# Patient Record
Sex: FEMALE | Race: WHITE | NOT HISPANIC OR LATINO | URBAN - METROPOLITAN AREA
[De-identification: names, ages, dates, MRNs, and addresses within clinical notes are randomized per-mention and may not be internally consistent; named-entity substitution may affect disease eponyms.]

---

## 2018-08-04 ENCOUNTER — INPATIENT (INPATIENT)
Facility: HOSPITAL | Age: 83
LOS: 2 days | Discharge: HOME | DRG: 305 | End: 2018-08-07
Attending: INTERNAL MEDICINE | Admitting: INTERNAL MEDICINE
Payer: MEDICARE

## 2018-08-04 VITALS
SYSTOLIC BLOOD PRESSURE: 208 MMHG | RESPIRATION RATE: 16 BRPM | OXYGEN SATURATION: 98 % | HEART RATE: 92 BPM | DIASTOLIC BLOOD PRESSURE: 81 MMHG

## 2018-08-04 DIAGNOSIS — L03.119 CELLULITIS OF UNSPECIFIED PART OF LIMB: ICD-10-CM
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
## 2018-08-04 DIAGNOSIS — D64.9 ANEMIA, UNSPECIFIED: ICD-10-CM

## 2018-08-04 DIAGNOSIS — I16.0 HYPERTENSIVE URGENCY: ICD-10-CM

## 2018-08-04 DIAGNOSIS — E87.0 HYPEROSMOLALITY AND HYPERNATREMIA: ICD-10-CM

## 2018-08-04 LAB
ALBUMIN SERPL ELPH-MCNC: 3.8 G/DL — SIGNIFICANT CHANGE UP (ref 3.3–5)
ALP SERPL-CCNC: 65 U/L — SIGNIFICANT CHANGE UP (ref 40–120)
ALT FLD-CCNC: 18 U/L — SIGNIFICANT CHANGE UP (ref 10–45)
ANION GAP SERPL CALC-SCNC: 13 MMOL/L — SIGNIFICANT CHANGE UP (ref 5–17)
APPEARANCE UR: CLEAR — SIGNIFICANT CHANGE UP
AST SERPL-CCNC: 34 U/L — SIGNIFICANT CHANGE UP (ref 10–40)
BASOPHILS # BLD AUTO: 0 K/UL — SIGNIFICANT CHANGE UP (ref 0–0.2)
BASOPHILS NFR BLD AUTO: 0.1 % — SIGNIFICANT CHANGE UP (ref 0–2)
BILIRUB SERPL-MCNC: 0.5 MG/DL — SIGNIFICANT CHANGE UP (ref 0.2–1.2)
BILIRUB UR-MCNC: NEGATIVE — SIGNIFICANT CHANGE UP
BUN SERPL-MCNC: 30 MG/DL — HIGH (ref 7–23)
CALCIUM SERPL-MCNC: 8.8 MG/DL — SIGNIFICANT CHANGE UP (ref 8.4–10.5)
CHLORIDE SERPL-SCNC: 107 MMOL/L — SIGNIFICANT CHANGE UP (ref 96–108)
CK SERPL-CCNC: 503 U/L — HIGH (ref 25–170)
CO2 SERPL-SCNC: 26 MMOL/L — SIGNIFICANT CHANGE UP (ref 22–31)
COLOR SPEC: SIGNIFICANT CHANGE UP
CREAT SERPL-MCNC: 0.93 MG/DL — SIGNIFICANT CHANGE UP (ref 0.5–1.3)
DIFF PNL FLD: ABNORMAL
EOSINOPHIL # BLD AUTO: 0 K/UL — SIGNIFICANT CHANGE UP (ref 0–0.5)
EOSINOPHIL NFR BLD AUTO: 0.1 % — SIGNIFICANT CHANGE UP (ref 0–6)
GLUCOSE SERPL-MCNC: 111 MG/DL — HIGH (ref 70–99)
GLUCOSE UR QL: NEGATIVE — SIGNIFICANT CHANGE UP
HCT VFR BLD CALC: 29.8 % — LOW (ref 34.5–45)
HGB BLD-MCNC: 10.1 G/DL — LOW (ref 11.5–15.5)
HYALINE CASTS # UR AUTO: ABNORMAL
KETONES UR-MCNC: NEGATIVE — SIGNIFICANT CHANGE UP
LEUKOCYTE ESTERASE UR-ACNC: NEGATIVE — SIGNIFICANT CHANGE UP
LYMPHOCYTES # BLD AUTO: 0.6 K/UL — LOW (ref 1–3.3)
LYMPHOCYTES # BLD AUTO: 5.1 % — LOW (ref 13–44)
MCHC RBC-ENTMCNC: 31.3 PG — SIGNIFICANT CHANGE UP (ref 27–34)
MCHC RBC-ENTMCNC: 33.8 GM/DL — SIGNIFICANT CHANGE UP (ref 32–36)
MCV RBC AUTO: 92.7 FL — SIGNIFICANT CHANGE UP (ref 80–100)
MONOCYTES # BLD AUTO: 0.8 K/UL — SIGNIFICANT CHANGE UP (ref 0–0.9)
MONOCYTES NFR BLD AUTO: 7 % — SIGNIFICANT CHANGE UP (ref 2–14)
NEUTROPHILS # BLD AUTO: 9.6 K/UL — HIGH (ref 1.8–7.4)
NEUTROPHILS NFR BLD AUTO: 87.6 % — HIGH (ref 43–77)
NITRITE UR-MCNC: NEGATIVE — SIGNIFICANT CHANGE UP
PH UR: 7 — SIGNIFICANT CHANGE UP (ref 5–8)
PLATELET # BLD AUTO: 347 K/UL — SIGNIFICANT CHANGE UP (ref 150–400)
POTASSIUM SERPL-MCNC: 3.8 MMOL/L — SIGNIFICANT CHANGE UP (ref 3.5–5.3)
POTASSIUM SERPL-SCNC: 3.8 MMOL/L — SIGNIFICANT CHANGE UP (ref 3.5–5.3)
PROT SERPL-MCNC: 7.3 G/DL — SIGNIFICANT CHANGE UP (ref 6–8.3)
PROT UR-MCNC: 100 MG/DL
RBC # BLD: 3.22 M/UL — LOW (ref 3.8–5.2)
RBC # FLD: 12.9 % — SIGNIFICANT CHANGE UP (ref 10.3–14.5)
RBC CASTS # UR COMP ASSIST: SIGNIFICANT CHANGE UP /HPF (ref 0–2)
SODIUM SERPL-SCNC: 146 MMOL/L — HIGH (ref 135–145)
SP GR SPEC: 1.01 — SIGNIFICANT CHANGE UP (ref 1.01–1.02)
UROBILINOGEN FLD QL: NEGATIVE — SIGNIFICANT CHANGE UP
WBC # BLD: 10.9 K/UL — HIGH (ref 3.8–10.5)
WBC # FLD AUTO: 10.9 K/UL — HIGH (ref 3.8–10.5)

## 2018-08-04 PROCEDURE — 93010 ELECTROCARDIOGRAM REPORT: CPT

## 2018-08-04 PROCEDURE — 73610 X-RAY EXAM OF ANKLE: CPT | Mod: 26,RT

## 2018-08-04 PROCEDURE — 99285 EMERGENCY DEPT VISIT HI MDM: CPT | Mod: 25

## 2018-08-04 PROCEDURE — 73700 CT LOWER EXTREMITY W/O DYE: CPT | Mod: 26,RT

## 2018-08-04 PROCEDURE — 73502 X-RAY EXAM HIP UNI 2-3 VIEWS: CPT | Mod: 26,LT

## 2018-08-04 PROCEDURE — 73620 X-RAY EXAM OF FOOT: CPT | Mod: 26,RT

## 2018-08-04 PROCEDURE — 76377 3D RENDER W/INTRP POSTPROCES: CPT | Mod: 26

## 2018-08-04 PROCEDURE — 71045 X-RAY EXAM CHEST 1 VIEW: CPT | Mod: 26

## 2018-08-04 PROCEDURE — 70450 CT HEAD/BRAIN W/O DYE: CPT | Mod: 26

## 2018-08-04 RX ORDER — AMLODIPINE BESYLATE 2.5 MG/1
5 TABLET ORAL DAILY
Qty: 0 | Refills: 0 | Status: DISCONTINUED | OUTPATIENT
Start: 2018-08-04 | End: 2018-08-07

## 2018-08-04 RX ORDER — ENOXAPARIN SODIUM 100 MG/ML
40 INJECTION SUBCUTANEOUS DAILY
Qty: 0 | Refills: 0 | Status: DISCONTINUED | OUTPATIENT
Start: 2018-08-04 | End: 2018-08-07

## 2018-08-04 RX ORDER — ACETAMINOPHEN 500 MG
650 TABLET ORAL EVERY 6 HOURS
Qty: 0 | Refills: 0 | Status: DISCONTINUED | OUTPATIENT
Start: 2018-08-04 | End: 2018-08-07

## 2018-08-04 RX ORDER — ACETAMINOPHEN 500 MG
650 TABLET ORAL ONCE
Qty: 0 | Refills: 0 | Status: COMPLETED | OUTPATIENT
Start: 2018-08-04 | End: 2018-08-04

## 2018-08-04 RX ORDER — MULTIVIT-MIN/FERROUS GLUCONATE 9 MG/15 ML
1 LIQUID (ML) ORAL
Qty: 0 | Refills: 0 | COMMUNITY

## 2018-08-04 RX ORDER — LISINOPRIL 2.5 MG/1
10 TABLET ORAL DAILY
Qty: 0 | Refills: 0 | Status: DISCONTINUED | OUTPATIENT
Start: 2018-08-04 | End: 2018-08-06

## 2018-08-04 RX ORDER — SODIUM CHLORIDE 9 MG/ML
1000 INJECTION INTRAMUSCULAR; INTRAVENOUS; SUBCUTANEOUS ONCE
Qty: 0 | Refills: 0 | Status: COMPLETED | OUTPATIENT
Start: 2018-08-04 | End: 2018-08-04

## 2018-08-04 RX ORDER — AMLODIPINE BESYLATE 2.5 MG/1
5 TABLET ORAL ONCE
Qty: 0 | Refills: 0 | Status: COMPLETED | OUTPATIENT
Start: 2018-08-04 | End: 2018-08-04

## 2018-08-04 RX ADMIN — SODIUM CHLORIDE 4000 MILLILITER(S): 9 INJECTION INTRAMUSCULAR; INTRAVENOUS; SUBCUTANEOUS at 15:40

## 2018-08-04 RX ADMIN — AMLODIPINE BESYLATE 5 MILLIGRAM(S): 2.5 TABLET ORAL at 17:41

## 2018-08-04 RX ADMIN — ENOXAPARIN SODIUM 40 MILLIGRAM(S): 100 INJECTION SUBCUTANEOUS at 21:43

## 2018-08-04 RX ADMIN — LISINOPRIL 10 MILLIGRAM(S): 2.5 TABLET ORAL at 21:43

## 2018-08-04 NOTE — CHART NOTE - NSCHARTNOTEFT_GEN_A_CORE
referred to patient as patient reportedly fell at home. LMSW made aware by social work colleague that patient’s spouse currently remains hospitalized at this hospital.      met with patient at bedside. Patient A&Ox4 and receptive to . 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 Patient states that she lives with spouse in a ranch style private residence in [place]. Patient states that the residence has a ramp to enter the font and no stairs inside. Patient states that there is also a cane, walker, wheelchair and shower chair at the residence. Patient reports being independent with all ADLs and ambulation prior to hospitalization. Patient states that she cooks, cleans, does grocery shopping, etc for spouse.     Patient currently pending medical workup. Patient requesting to be discharged home when medically cleared. Patient denies any concerns at home. Patient states that she feels safe at home. Patient states that she is able to care for self any spouse at home without any issues.      to remain available.

## 2018-08-04 NOTE — ED PROVIDER NOTE - CARE PLAN
Principal Discharge DX:	Hypertensive urgency  Secondary Diagnosis:	Fall from standing, initial encounter  Secondary Diagnosis:	Hip pain, acute, left

## 2018-08-04 NOTE — ED ADULT NURSE NOTE - OBJECTIVE STATEMENT
89 y/ro F, reported to ED from home. A&ox3, s/p fall. Pt reports that she fell last night. Pt reports that she had a mechanical fall over her wheelchair. Pt reports that she was unable to get off of the floor. Pt reports that she was finally able to call for help this morning. EMS found the pt on the floor. Pt denies LOC. Pt reports that she hit her head when she fell and she has a hematoma above her right eye. Pt reports that "I feel fine, I don't want to be here, I just wanted them to help me off the floor." Pt denies any pains. Pt denies syncope. Pt denies LOC, C/P, H/A, SOB, N/V/D, abd pain. Pt denies urinary symptoms. Pt was found in feces and has a strong smell of urine off of her. Pt refuses rectal temperature and refuses straight catheterization for urine. Pt denies fever or chills. Pt reports "my  was admitted to the hospital yesterday and today I am here." Will continue to monitor pt.

## 2018-08-04 NOTE — ED ADULT NURSE NOTE - NSIMPLEMENTINTERV_GEN_ALL_ED
Implemented All Fall with Harm Risk Interventions:  [place]illy to call system. Call bell, personal items and telephone within reach. Instruct patient to call for assistance. Room bathroom lighting operational. Non-slip footwear when patient is off stretcher. Physically safe environment: no spills, clutter or unnecessary equipment. Stretcher in lowest position, wheels locked, appropriate side rails in place. Provide visual cue, wrist band, yellow gown, etc. Monitor gait and stability. Monitor for mental status changes and reorient to person, place, and time. Review medications for side effects contributing to fall risk. Reinforce activity limits and safety measures with patient and family. Provide visual clues: red socks.

## 2018-08-04 NOTE — ED PROVIDER NOTE - MEDICAL DECISION MAKING DETAILS
pt on ground overnight, will check cpk, given that she smells of urine, concern for uti, do stray cath urine, rectal temp, ekg, left hip xray unlikely fx given pe findings. pt may not be stable for dc just given housing situation, may need to involve social work.  admitted in hospital now, they live alone.

## 2018-08-04 NOTE — H&P ADULT - HISTORY OF PRESENT ILLNESS
89 y/o F with pmhx of hip fx and pshx of hysterectomy and hip replacement presents s/p mechanical fall last night w/ hematoma on head. Pt tripped over her wheelchair, was unable to call 911 as she wasn't able to get up. Notes associated left hip pain, which was fractured in 2008. Denies loc, blurred vision, neck pain, cp.  Not on blood thinners. Endorses use of Advil. Not on any other meds. No PMD. I want to eat as my birthday today.

## 2018-08-04 NOTE — ED PROVIDER NOTE - PROGRESS NOTE DETAILS
pt declined rectal temp,  rectal exam, stray cath urine and Tylenol. says she feels fine. pt has capacity to make her own decisions. d/w orthopedics, will see pt.

## 2018-08-04 NOTE — ED ADULT NURSE REASSESSMENT NOTE - NS ED NURSE REASSESS COMMENT FT1
straight catheter procedure performed with 2 RNs at the bedside. sterile technique maintained. patient tolerated well. 700 cc dark yellow urine noted. urine sent to lab as ordered.

## 2018-08-04 NOTE — ED PROVIDER NOTE - PHYSICAL EXAMINATION
Gen: frail and thin, smells of urine, well appearing, of stated age, no acute distress; Head: NC, AT; ENT: MMM, no uvular deviation; Neck: no tenderness to entire spine, supple with full ROM; Chest: CTAB, no retractions, rate normal, appears to breath comfortable; Heart: RRR S1S2 No JVD No peripheral edema b/l pulses 2+ in arms and legs; Abd: Soft non-tender, no rebound or guarding, no masses, no valderrama sign, no mcburney tenderness, no CVAT; Back: No spinal deformity; Ext: left leg shorten compared to right, Moving3/4 extremities without obvious impairment to ROM, right ankle, lateral opal with significant tenderness and some surrounding redness with darkness and multiple skin abrasions to both shins, no point tenderness to hip, no obvious weakness; Neuro: fluid speech, no focal deficits, oriented to person, place, situation; Psych: No anxiety, depression or pressured speech noted; Skin: no utricaria, no diffuse rash. [facility]

## 2018-08-04 NOTE — H&P ADULT - RS GEN PE MLT RESP DETAILS PC
no chest wall tenderness/no rhonchi/no wheezes/normal/breath sounds equal/respirations non-labored/airway patent/no subcutaneous emphysema/good air movement/clear to auscultation bilaterally/no intercostal retractions/no rales

## 2018-08-04 NOTE — ED PROVIDER NOTE - OBJECTIVE STATEMENT
89 y/o F with pmhx of hip fx and pshx of hysterectomy and hip replacement presents s/p mechanical fall last night w/ hematoma on head. Pt tripped over her wheelchair, was unable to call 911 as she wasn't able to get up. Notes associated left hip pain, which was fractured in 2008. Denies loc, blurred vision, neck pain, cp.  Not on blood thinners. Endorses use of Advil. Not on any other meds. No PMD.

## 2018-08-04 NOTE — CONSULT NOTE ADULT - SUBJECTIVE AND OBJECTIVE BOX
89F admitted to [facility] for HTN/L hip pain. Pt states she had a mechanical fall on Friday evening. Admits to HS, denies LOC. Pt was unable to get up at that time. Pt was on the ground overnight before she was able to get to a phone to call for help. In ED Pt states she has L hip pain. Denies any L hip pain prior to the fall. Of note, Pt had L hip hemiarthroplasty done in 2008 by Dr. Freitas. Pt states her L leg has been shorter since the surgery and has progressively been getting shorter over the years. Pt has not followed up with orthopedics since the surgery. No other orthopedic complaints. Pt states she normally ambulates unassisted however has a walker and wheelchair at home she uses occasionally.     CONSTITUTIONAL: No fever or chills  HEENT:  No headache, no sore throat  RESPIRATORY: No cough, wheezing, or shortness of breath  CARDIOVASCULAR: No chest pain, palpitations, or leg swelling  GASTROINTESTINAL: No nausea, vomiting, or diarrhea  GENITOURINARY: No dysuria, frequency, or hematuria  NEUROLOGICAL: no focal weakness or dizziness  SKIN:  No rashes or lesions   MUSCULOSKELETAL: no myalgias   PSYCHIATRIC: No depression or anxiety    PAST MEDICAL & SURGICAL HISTORY:  Hip fracture: 2008  S/P hysterectomy: 1997  S/P hip replacement    Home Medications:  Advil 200 mg oral tablet: 1 tab(s) orally 2 times a day (04 Aug 2018 18:28)  Centrum Silver oral tablet: 1 tab(s) orally once a day (04 Aug 2018 18:28)    Allergies    No Known Allergies    Intolerances    Vital Signs Last 24 Hrs  T(C): 37.1 (04 Aug 2018 19:30), Max: 37.1 (04 Aug 2018 19:30)  T(F): 98.7 (04 Aug 2018 19:30), Max: 98.7 (04 Aug 2018 19:30)  HR: 86 (04 Aug 2018 19:30) (86 - 94)  BP: 166/75 (04 Aug 2018 19:30) (166/75 - 209/71)  BP(mean): --  RR: 18 (04 Aug 2018 19:30) (16 - 18)  SpO2: 97% (04 Aug 2018 19:30) (97% - 100%)                          10.1   10.9  )-----------( 347      ( 04 Aug 2018 13:57 )             29.8     146<H>  |  107  |  30<H>  ----------------------------<  111<H>  3.8   |  26  |  0.93    Imaging: XR Pel/L Hip: +previous L hip hemiarthroplasty with superomedial migration, intertrochanteric region abutting completely remodeled acetabulum, no obvious fractures    Physical  Gen: NAD, AAOx3  LLE: Skin intact, +shortened LLE, +minimal TTP over lateral hip, no bony ttp elsewhere, +EHL/FHL/TA/GS, SILT, +dp pulse intact, compartments soft/compressible, able to SLR, negative log roll, restricted ROM at hip with int/ext rotation    Secondary Survey: +ecchymosis over R eye/forehead, No TTP over bony prominences, SILT, palpable pulses, full/painless range of motion, compartments soft, able to SLR contralateral extremity

## 2018-08-04 NOTE — PATIENT PROFILE ADULT. - FALL HARM RISK
coagulation(Bleeding disorder R/T clinical cond/anti-coags)/other/bones(Osteoporosis,prev fx,steroid use,metastatic bone ca/age(85 years old or older)/recent fall

## 2018-08-05 DIAGNOSIS — I87.2 VENOUS INSUFFICIENCY (CHRONIC) (PERIPHERAL): ICD-10-CM

## 2018-08-05 DIAGNOSIS — I83.893 VARICOSE VEINS OF BILATERAL LOWER EXTREMITIES WITH OTHER COMPLICATIONS: ICD-10-CM

## 2018-08-05 DIAGNOSIS — I73.9 PERIPHERAL VASCULAR DISEASE, UNSPECIFIED: ICD-10-CM

## 2018-08-05 DIAGNOSIS — R25.2 CRAMP AND SPASM: ICD-10-CM

## 2018-08-05 DIAGNOSIS — G47.62 SLEEP RELATED LEG CRAMPS: ICD-10-CM

## 2018-08-05 LAB
ANION GAP SERPL CALC-SCNC: 18 MMOL/L — HIGH (ref 5–17)
BUN SERPL-MCNC: 16 MG/DL — SIGNIFICANT CHANGE UP (ref 7–23)
CALCIUM SERPL-MCNC: 9.1 MG/DL — SIGNIFICANT CHANGE UP (ref 8.4–10.5)
CHLORIDE SERPL-SCNC: 100 MMOL/L — SIGNIFICANT CHANGE UP (ref 96–108)
CO2 SERPL-SCNC: 22 MMOL/L — SIGNIFICANT CHANGE UP (ref 22–31)
CREAT SERPL-MCNC: 0.85 MG/DL — SIGNIFICANT CHANGE UP (ref 0.5–1.3)
CULTURE RESULTS: NO GROWTH — SIGNIFICANT CHANGE UP
FERRITIN SERPL-MCNC: 118 NG/ML — SIGNIFICANT CHANGE UP (ref 15–150)
FOLATE SERPL-MCNC: >20 NG/ML — SIGNIFICANT CHANGE UP
GLUCOSE SERPL-MCNC: 129 MG/DL — HIGH (ref 70–99)
HAPTOGLOB SERPL-MCNC: 100 MG/DL — SIGNIFICANT CHANGE UP (ref 34–200)
HCT VFR BLD CALC: 39.9 % — SIGNIFICANT CHANGE UP (ref 34.5–45)
HGB BLD-MCNC: 13.3 G/DL — SIGNIFICANT CHANGE UP (ref 11.5–15.5)
IRON SATN MFR SERPL: 23 % — SIGNIFICANT CHANGE UP (ref 14–50)
IRON SATN MFR SERPL: 71 UG/DL — SIGNIFICANT CHANGE UP (ref 30–160)
MCHC RBC-ENTMCNC: 28.5 PG — SIGNIFICANT CHANGE UP (ref 27–34)
MCHC RBC-ENTMCNC: 33.3 GM/DL — SIGNIFICANT CHANGE UP (ref 32–36)
MCV RBC AUTO: 85.4 FL — SIGNIFICANT CHANGE UP (ref 80–100)
PLATELET # BLD AUTO: 157 K/UL — SIGNIFICANT CHANGE UP (ref 150–400)
POTASSIUM SERPL-MCNC: 3.7 MMOL/L — SIGNIFICANT CHANGE UP (ref 3.5–5.3)
POTASSIUM SERPL-SCNC: 3.7 MMOL/L — SIGNIFICANT CHANGE UP (ref 3.5–5.3)
RBC # BLD: 4.67 M/UL — SIGNIFICANT CHANGE UP (ref 3.8–5.2)
RBC # FLD: 13.3 % — SIGNIFICANT CHANGE UP (ref 10.3–14.5)
SODIUM SERPL-SCNC: 140 MMOL/L — SIGNIFICANT CHANGE UP (ref 135–145)
SPECIMEN SOURCE: SIGNIFICANT CHANGE UP
TIBC SERPL-MCNC: 311 UG/DL — SIGNIFICANT CHANGE UP (ref 220–430)
UIBC SERPL-MCNC: 240 UG/DL — SIGNIFICANT CHANGE UP (ref 110–370)
VIT B12 SERPL-MCNC: 1822 PG/ML — HIGH (ref 232–1245)
WBC # BLD: 8.85 K/UL — SIGNIFICANT CHANGE UP (ref 3.8–10.5)
WBC # FLD AUTO: 8.85 K/UL — SIGNIFICANT CHANGE UP (ref 3.8–10.5)

## 2018-08-05 PROCEDURE — 99221 1ST HOSP IP/OBS SF/LOW 40: CPT

## 2018-08-05 RX ADMIN — ENOXAPARIN SODIUM 40 MILLIGRAM(S): 100 INJECTION SUBCUTANEOUS at 13:14

## 2018-08-05 RX ADMIN — AMLODIPINE BESYLATE 5 MILLIGRAM(S): 2.5 TABLET ORAL at 06:07

## 2018-08-05 RX ADMIN — LISINOPRIL 10 MILLIGRAM(S): 2.5 TABLET ORAL at 06:07

## 2018-08-05 NOTE — PROGRESS NOTE ADULT - SUBJECTIVE AND OBJECTIVE BOX
INTERVAL HPI/OVERNIGHT EVENTS: no new concerns.  Vital Signs Last 24 Hrs  T(C): 36.7 (05 Aug 2018 04:33), Max: 37.1 (04 Aug 2018 19:30)  T(F): 98 (05 Aug 2018 04:33), Max: 98.7 (04 Aug 2018 19:30)  HR: 66 (05 Aug 2018 04:33) (66 - 94)  BP: 125/60 (05 Aug 2018 04:33) (125/60 - 209/71)  BP(mean): --  RR: 18 (05 Aug 2018 04:33) (16 - 18)  SpO2: 98% (05 Aug 2018 04:33) (97% - 100%)  I&O's Summary    04 Aug 2018 07:01  -  05 Aug 2018 07:00  --------------------------------------------------------  IN: 75 mL / OUT: 350 mL / NET: -275 mL      MEDICATIONS  (STANDING):  amLODIPine   Tablet 5 milliGRAM(s) Oral daily  enoxaparin Injectable 40 milliGRAM(s) SubCutaneous daily  lisinopril 10 milliGRAM(s) Oral daily    MEDICATIONS  (PRN):  acetaminophen   Tablet. 650 milliGRAM(s) Oral every 6 hours PRN Moderate Pain (4 - 6)    LABS:                        13.3   8.85  )-----------( 157      ( 05 Aug 2018 08:36 )             39.9     08-05    140  |  100  |  16  ----------------------------<  129<H>  3.7   |  22  |  0.85    Ca    9.1      05 Aug 2018 06:57    TPro  7.3  /  Alb  3.8  /  TBili  0.5  /  DBili  x   /  AST  34  /  ALT  18  /  AlkPhos  65  08-04      Urinalysis Basic - ( 04 Aug 2018 15:26 )    Color: PL Yellow / Appearance: Clear / S.015 / pH: x  Gluc: x / Ketone: Negative  / Bili: Negative / Urobili: Negative   Blood: x / Protein: 100 mg/dL / Nitrite: Negative   Leuk Esterase: Negative / RBC: 3-5 /HPF / WBC x   Sq Epi: x / Non Sq Epi: x / Bacteria: x      CAPILLARY BLOOD GLUCOSE            Urinalysis Basic - ( 04 Aug 2018 15:26 )    Color: PL Yellow / Appearance: Clear / S.015 / pH: x  Gluc: x / Ketone: Negative  / Bili: Negative / Urobili: Negative   Blood: x / Protein: 100 mg/dL / Nitrite: Negative   Leuk Esterase: Negative / RBC: 3-5 /HPF / WBC x   Sq Epi: x / Non Sq Epi: x / Bacteria: x      REVIEW OF SYSTEMS:  CONSTITUTIONAL: No fever, weight loss, or fatigue  EYES: No eye pain, visual disturbances, or discharge  ENMT:  No difficulty hearing, tinnitus, vertigo; No sinus or throat pain  NECK: No pain or stiffness  BREASTS: No pain, masses, or nipple discharge  RESPIRATORY: No cough, wheezing, chills or hemoptysis; No shortness of breath  CARDIOVASCULAR: No chest pain, palpitations, dizziness, or leg swelling  GASTROINTESTINAL: No abdominal or epigastric pain. No nausea, vomiting, or hematemesis; No diarrhea or constipation. No melena or hematochezia.  GENITOURINARY: No dysuria, frequency, hematuria, or incontinence  NEUROLOGICAL: No headaches, memory loss, loss of strength, numbness, or tremors  SKIN: No itching, burning, rashes, or lesions   LYMPH NODES: No enlarged glands  ENDOCRINE: No heat or cold intolerance; No hair loss      Consultant(s) Notes Reviewed:  [x ] YES  [ ] NO    PHYSICAL EXAM:  GENERAL: NAD, well-groomed, well-developed,not in any distress ,  HEAD:  Atraumatic, Normocephalic  EYES: EOMI, PERRLA, conjunctiva and sclera clear  ENMT: No tonsillar erythema, exudates, or enlargement; Moist mucous membranes, Good dentition, No lesions  NECK: Supple, No JVD, Normal thyroid  NERVOUS SYSTEM:  Alert & Oriented X3, No focal deficit   CHEST/LUNG: Good air entry bilateral with no  rales, rhonchi, wheezing, or rubs  HEART: Regular rate and rhythm; No murmurs, rubs, or gallops  ABDOMEN: Soft, Nontender, Nondistended; Bowel sounds present  EXTREMITIES:  2+ Peripheral Pulses, No clubbing, cyanosis, or edema  SKIN: bruises forehead     Care Discussed with Consultants/Other Providers [ x] YES  [ ] NO

## 2018-08-05 NOTE — CONSULT NOTE ADULT - PROBLEM SELECTOR RECOMMENDATION 9
Likely due to dehydration  Now resolved S/P IVF  Can stop IVF as patient has access to water now and has good appetite  Daily BMP recommended
as above

## 2018-08-05 NOTE — CONSULT NOTE ADULT - ATTENDING COMMENTS
Thank you for this consult    For any question, call:  Cell # [phone]  Pager # [phone]  Callback # [phone]
as above

## 2018-08-05 NOTE — PROGRESS NOTE ADULT - ASSESSMENT
89 y/o F with pmhx of hip fx and pshx of hysterectomy and hip replacement presents s/p mechanical fall last night w/ hematoma on head. Pt tripped over her wheelchair, was unable to call 911 as she wasn't able to get up. Notes associated left hip pain, which was fractured in 2008. Denies loc, blurred vision, neck pain, cp.  Not on blood thinners. Endorses use of Advil. Not on any other meds. No PMD.     Problem/Plan - 1:  ·  Problem: Hypertensive urgency.  Plan: Denies any Headache ,CP or SOB etc Will adjust BP medications . BP readings much better .      Problem/Plan - 2:  ·  Problem: Fall from standing, initial encounter.  Plan: Mechanical fall per pt . CT scans negative for fracture . Will consult PT.      Problem/Plan - 3:  ·  Problem: Cellulitis of lower extremity.  Plan: Holding Abxs . ID consulted . Will check FRACISCO to R/O PVD .      Problem/Plan - 4:  ·  Problem: Anemia.  Plan: Work up in progress.      Problem/Plan - 5:  ·  Problem: Hypernatremia.  Plan: Renal consulted. Increasing PO water intake. Rpt BMP normal.      DVT : prophylaxis.

## 2018-08-05 NOTE — CONSULT NOTE ADULT - SUBJECTIVE AND OBJECTIVE BOX
Vascular Surgery Consult Note  Pager [phone]    HPI:  89 y/o F with pmhx of hip fx and pshx of hysterectomy and hip replacement presents s/p mechanical fall last night w/ hematoma on head. Vascular surgery consulted for right leg cellulitis with possible venous insufficiency      PAST MEDICAL & SURGICAL HISTORY:  Hip fracture:   S/P hysterectomy:   S/P hip replacement      ALLERGIES:    HOME MEDICATIONS:    MEDICATIONS  (STANDING):  amLODIPine   Tablet 5 milliGRAM(s) Oral daily  enoxaparin Injectable 40 milliGRAM(s) SubCutaneous daily  lisinopril 10 milliGRAM(s) Oral daily      SOCIAL HISTORY:    FAMILY HISTORY:    ___________________________________________  REVIEW OF SYSTEMS:    ___________________________________________  PHYSICAL EXAM:  Vital Signs Last 24 Hrs  T(C): 37.2 (05 Aug 2018 12:59), Max: 37.2 (05 Aug 2018 12:59)  T(F): 98.9 (05 Aug 2018 12:59), Max: 98.9 (05 Aug 2018 12:59)  HR: 78 (05 Aug 2018 12:59) (66 - 88)  BP: 122/58 (05 Aug 2018 12:59) (122/58 - 203/64)  BP(mean): --  RR: 18 (05 Aug 2018 12:59) (18 - 18)  SpO2: 97% (05 Aug 2018 12:59) (97% - 99%)CAPILLARY BLOOD GLUCOSE        I&O's Detail    04 Aug 2018 07:01  -  05 Aug 2018 07:00  --------------------------------------------------------  IN:    Oral Fluid: 75 mL  Total IN: 75 mL    OUT:    Voided: 350 mL  Total OUT: 350 mL    Total NET: -275 mL      05 Aug 2018 07:01  -  05 Aug 2018 15:32  --------------------------------------------------------  IN:    Oral Fluid: 720 mL  Total IN: 720 mL    OUT:    Voided: 360 mL  Total OUT: 360 mL    Total NET: 360 mL          General: A&O x3, NAD  Neuro: CN II-XII intact, motor and sensory grossly intact with no focal deficits.  HEENT: Normocephalic, atraumatic, EOMI, anicteric sclerae.  Respiratory: Clear to auscultation bilaterally, breathing non-labored.  CVS: Regular rate and rhythm  Abdomen: Soft, nondistended, nontender. No rebound, no guarding, No palpable organomegaly or masses.  Extremities: Warm bilaterally with palpable pulses.  MSK: Intact ROM.  ____________________________________________  LABS:  CBC Full  -  ( 05 Aug 2018 08:36 )  WBC Count : 8.85 K/uL  Hemoglobin : 13.3 g/dL  Hematocrit : 39.9 %  Platelet Count - Automated : 157 K/uL  Mean Cell Volume : 85.4 fl  Mean Cell Hemoglobin : 28.5 pg  Mean Cell Hemoglobin Concentration : 33.3 gm/dL  Auto Neutrophil # : x  Auto Lymphocyte # : x  Auto Monocyte # : x  Auto Eosinophil # : x  Auto Basophil # : x  Auto Neutrophil % : x  Auto Lymphocyte % : x  Auto Monocyte % : x  Auto Eosinophil % : x  Auto Basophil % : x        140  |  100  |  16  ----------------------------<  129<H>  3.7   |  22  |  0.85    Ca    9.1      05 Aug 2018 06:57    TPro  7.3  /  Alb  3.8  /  TBili  0.5  /  DBili  x   /  AST  34  /  ALT  18  /  AlkPhos  65  08-04    LIVER FUNCTIONS - ( 04 Aug 2018 13:57 )  Alb: 3.8 g/dL / Pro: 7.3 g/dL / ALK PHOS: 65 U/L / ALT: 18 U/L / AST: 34 U/L / GGT: x             Urinalysis Basic - ( 04 Aug 2018 15:26 )    Color: PL Yellow / Appearance: Clear / S.015 / pH: x  Gluc: x / Ketone: Negative  / Bili: Negative / Urobili: Negative   Blood: x / Protein: 100 mg/dL / Nitrite: Negative   Leuk Esterase: Negative / RBC: 3-5 /HPF / WBC x   Sq Epi: x / Non Sq Epi: x / Bacteria: x      CARDIAC MARKERS ( 04 Aug 2018 13:57 )  x     / x     / 503 U/L / x     / x          ____________________________________________  RADIOLOGY: Vascular Surgery Consult Note  Pager [phone]    HPI:  89 y/o F with pmhx of hip fx and pshx of hysterectomy and hip replacement presents s/p mechanical fall last night w/ hematoma on head. Vascular surgery consulted for right leg cellulitis with possible venous insufficiency  Pt c/o arleen le leg and foot nocturnal cramps 1x/night  pt denies intermittent claudication     PAST MEDICAL & SURGICAL HISTORY:  Hip fracture:   S/P hysterectomy:   S/P hip replacement      ALLERGIES:    HOME MEDICATIONS:    MEDICATIONS  (STANDING):  amLODIPine   Tablet 5 milliGRAM(s) Oral daily  enoxaparin Injectable 40 milliGRAM(s) SubCutaneous daily  lisinopril 10 milliGRAM(s) Oral daily      SOCIAL HISTORY:    FAMILY HISTORY:    ___________________________________________  REVIEW OF SYSTEMS:    ___________________________________________  PHYSICAL EXAM:  Vital Signs Last 24 Hrs  T(C): 37.2 (05 Aug 2018 12:59), Max: 37.2 (05 Aug 2018 12:59)  T(F): 98.9 (05 Aug 2018 12:59), Max: 98.9 (05 Aug 2018 12:59)  HR: 78 (05 Aug 2018 12:59) (66 - 88)  BP: 122/58 (05 Aug 2018 12:59) (122/58 - 203/64)  BP(mean): --  RR: 18 (05 Aug 2018 12:59) (18 - 18)  SpO2: 97% (05 Aug 2018 12:59) (97% - 99%)CAPILLARY BLOOD GLUCOSE        I&O's Detail    04 Aug 2018 07:01  -  05 Aug 2018 07:00  --------------------------------------------------------  IN:    Oral Fluid: 75 mL  Total IN: 75 mL    OUT:    Voided: 350 mL  Total OUT: 350 mL    Total NET: -275 mL      05 Aug 2018 07:01  -  05 Aug 2018 15:32  --------------------------------------------------------  IN:    Oral Fluid: 720 mL  Total IN: 720 mL    OUT:    Voided: 360 mL  Total OUT: 360 mL    Total NET: 360 mL    General: A&O x3, NAD  Neuro: CN II-XII intact, motor and sensory grossly intact with no focal deficits.  HEENT: Normocephalic, atraumatic, EOMI, anicteric sclerae.  Respiratory: Clear to auscultation bilaterally, breathing non-labored.  CVS: Regular rate and rhythm  Abdomen: Soft, nondistended, nontender. No rebound, no guarding, No palpable organomegaly or masses.  Extremities: Warm bilaterally with palpable pulses.  MSK: Intact ROM.  ____________________________________________  LABS:  CBC Full  -  ( 05 Aug 2018 08:36 )  WBC Count : 8.85 K/uL  Hemoglobin : 13.3 g/dL  Hematocrit : 39.9 %  Platelet Count - Automated : 157 K/uL  Mean Cell Volume : 85.4 fl  Mean Cell Hemoglobin : 28.5 pg  Mean Cell Hemoglobin Concentration : 33.3 gm/dL  Auto Neutrophil # : x  Auto Lymphocyte # : x  Auto Monocyte # : x  Auto Eosinophil # : x  Auto Basophil # : x  Auto Neutrophil % : x  Auto Lymphocyte % : x  Auto Monocyte % : x  Auto Eosinophil % : x  Auto Basophil % : x        140  |  100  |  16  ----------------------------<  129<H>  3.7   |  22  |  0.85    Ca    9.1      05 Aug 2018 06:57    TPro  7.3  /  Alb  3.8  /  TBili  0.5  /  DBili  x   /  AST  34  /  ALT  18  /  AlkPhos  65  08-04    LIVER FUNCTIONS - ( 04 Aug 2018 13:57 )  Alb: 3.8 g/dL / Pro: 7.3 g/dL / ALK PHOS: 65 U/L / ALT: 18 U/L / AST: 34 U/L / GGT: x             Urinalysis Basic - ( 04 Aug 2018 15:26 )    Color: PL Yellow / Appearance: Clear / S.015 / pH: x  Gluc: x / Ketone: Negative  / Bili: Negative / Urobili: Negative   Blood: x / Protein: 100 mg/dL / Nitrite: Negative   Leuk Esterase: Negative / RBC: 3-5 /HPF / WBC x   Sq Epi: x / Non Sq Epi: x / Bacteria: x      CARDIAC MARKERS ( 04 Aug 2018 13:57 )  x     / x     / 503 U/L / x     / x          ____________________________________________  RADIOLOGY: Vascular Surgery Consult Note  Pager [phone]    HPI:  89 y/o F with pmhx of hip fx and pshx of hysterectomy and hip replacement presents s/p mechanical fall last night w/ hematoma on head. Vascular surgery consulted for right leg cellulitis with possible venous insufficiency  Pt c/o arleen le leg and foot nocturnal cramps 1x/night  pt denies intermittent claudication     PAST MEDICAL & SURGICAL HISTORY:  Hip fracture:   S/P hysterectomy:   S/P hip replacement      ALLERGIES: NKDA    MEDICATIONS  (STANDING):  amLODIPine   Tablet 5 milliGRAM(s) Oral daily  enoxaparin Injectable 40 milliGRAM(s) SubCutaneous daily  lisinopril 10 milliGRAM(s) Oral daily      SOCIAL HISTORY: former smoker, denies EtOH, denies illicit drug use, lives at home with her  and her daughter (both are sick, she is a primary caregiver); ambulates around home, but less lately    FAMILY HISTORY: no significant family history    ___________________________________________  REVIEW OF SYSTEMS: A ten point review of systems was otherwise negative.     ___________________________________________  PHYSICAL EXAM:  Vital Signs Last 24 Hrs  T(C): 37.2 (05 Aug 2018 12:59), Max: 37.2 (05 Aug 2018 12:59)  T(F): 98.9 (05 Aug 2018 12:59), Max: 98.9 (05 Aug 2018 12:59)  HR: 78 (05 Aug 2018 12:59) (66 - 88)  BP: 122/58 (05 Aug 2018 12:59) (122/58 - 203/64)  BP(mean): --  RR: 18 (05 Aug 2018 12:59) (18 - 18)  SpO2: 97% (05 Aug 2018 12:59) (97% - 99%)CAPILLARY BLOOD GLUCOSE        I&O's Detail    04 Aug 2018 07:01  -  05 Aug 2018 07:00  --------------------------------------------------------  IN:    Oral Fluid: 75 mL  Total IN: 75 mL    OUT:    Voided: 350 mL  Total OUT: 350 mL    Total NET: -275 mL      05 Aug 2018 07:01  -  05 Aug 2018 15:32  --------------------------------------------------------  IN:    Oral Fluid: 720 mL  Total IN: 720 mL    OUT:    Voided: 360 mL  Total OUT: 360 mL    Total NET: 360 mL    General: A&O x3, NAD  Neuro: CN II-XII intact, motor and sensory grossly intact with no focal deficits.  HEENT: Normocephalic, atraumatic, EOMI, anicteric sclerae.  Respiratory: Clear to auscultation bilaterally, breathing non-labored.  CVS: Regular rate and rhythm  Abdomen: Soft, nondistended, nontender. No rebound, no guarding, No palpable organomegaly or masses.  Extremities: Warm bilaterally with bilateral dopplerable DP and PT signals; R foot with dry scab/wound on lateral mal  MSK: Intact ROM.  ____________________________________________  LABS:  CBC Full  -  ( 05 Aug 2018 08:36 )  WBC Count : 8.85 K/uL  Hemoglobin : 13.3 g/dL  Hematocrit : 39.9 %  Platelet Count - Automated : 157 K/uL  Mean Cell Volume : 85.4 fl  Mean Cell Hemoglobin : 28.5 pg  Mean Cell Hemoglobin Concentration : 33.3 gm/dL  Auto Neutrophil # : x  Auto Lymphocyte # : x  Auto Monocyte # : x  Auto Eosinophil # : x  Auto Basophil # : x  Auto Neutrophil % : x  Auto Lymphocyte % : x  Auto Monocyte % : x  Auto Eosinophil % : x  Auto Basophil % : x        140  |  100  |  16  ----------------------------<  129<H>  3.7   |  22  |  0.85    Ca    9.1      05 Aug 2018 06:57    TPro  7.3  /  Alb  3.8  /  TBili  0.5  /  DBili  x   /  AST  34  /  ALT  18  /  AlkPhos  65  08-04    LIVER FUNCTIONS - ( 04 Aug 2018 13:57 )  Alb: 3.8 g/dL / Pro: 7.3 g/dL / ALK PHOS: 65 U/L / ALT: 18 U/L / AST: 34 U/L / GGT: x             Urinalysis Basic - ( 04 Aug 2018 15:26 )    Color: PL Yellow / Appearance: Clear / S.015 / pH: x  Gluc: x / Ketone: Negative  / Bili: Negative / Urobili: Negative   Blood: x / Protein: 100 mg/dL / Nitrite: Negative   Leuk Esterase: Negative / RBC: 3-5 /HPF / WBC x   Sq Epi: x / Non Sq Epi: x / Bacteria: x      CARDIAC MARKERS ( 04 Aug 2018 13:57 )  x     / x     / 503 U/L / x     / x          ____________________________________________  RADIOLOGY:

## 2018-08-05 NOTE — CONSULT NOTE ADULT - EXTREMITIES COMMENTS
mild to mod arterial insuff w mod trophic skin changes  moderate venous insuff w moderate stasis dermatitis  no wounds

## 2018-08-05 NOTE — CONSULT NOTE ADULT - SUBJECTIVE AND OBJECTIVE BOX
[facility] - Hipolito / Natalia S /Kishore/ S Frey/ S Moran/ John Paul Reynaldo / GENESIS Njeru  -------------------------------------------------------------------------------------------------------  The patient seen and examined today.  HPI:  89 y/o F with pmhx of hip fx and pshx of hysterectomy and hip replacement presents s/p mechanical fall last night w/ hematoma on head. Pt tripped over her wheelchair, was unable to call 911 as she wasn't able to get up. Notes associated left hip pain, which was fractured in . Denies loc, blurred vision, neck pain, cp.  Not on blood thinners. Endorses use of Advil. Not on any other meds. No PMD. I want to eat as my birthday today. (04 Aug 2018 18:15)      PAST MEDICAL & SURGICAL HISTORY:  Hip fracture:   S/P hysterectomy:   S/P hip replacement    Allergies :- No Known Allergies    Home Medications Reviewed  Hospital Medications:   MEDICATIONS  (STANDING):  amLODIPine   Tablet 5 milliGRAM(s) Oral daily  enoxaparin Injectable 40 milliGRAM(s) SubCutaneous daily  lisinopril 10 milliGRAM(s) Oral daily    SOCIAL HISTORY:  Denies ETOh,Smoking,   FAMILY HISTORY:  No pertinent family history in first degree relatives      REVIEW OF SYSTEMS:  CONSTITUTIONAL: No weakness, fevers or chills  EYES/ENT: No visual changes;  No vertigo or throat pain   NECK: No pain or stiffness  RESPIRATORY: No cough, wheezing, hemoptysis; No shortness of breath  CARDIOVASCULAR: No chest pain or palpitations.  GASTROINTESTINAL: No abdominal or epigastric pain. No nausea, vomiting, or hematemesis; No diarrhea or constipation. No melena or hematochezia.  GENITOURINARY: No dysuria, frequency, foamy urine, urinary urgency, incontinence or hematuria  NEUROLOGICAL: No numbness or weakness  SKIN: No itching, burning, rashes, or lesions   VASCULAR: No bilateral lower extremity edema.   All other review of systems is negative unless indicated above.    VITALS:  T(F): 98 (18 @ 04:33), Max: 98.7 (18 @ 19:30)  HR: 66 (18 04:33)  BP: 125/60 (18 @ 04:33)  RR: 18 (18 04:33)  SpO2: 98% (18:33)  Wt(kg): --     @ 07:01  -   07:00  --------------------------------------------------------  IN: 75 mL / OUT: 350 mL / NET: -275 mL      Height (cm): 165.1 (:30)  Weight (kg): 41.5 (:30)  BMI (kg/m2): 15.2 (:30)  BSA (m2): 1.42 (:30)    PHYSICAL EXAM:  Constitutional: NAD  HEENT: anicteric sclera, oropharynx clear, MMM  Neck: supple.   Respiratory: Bilateral equal breath sounds , no wheezes, no crackles  Cardiovascular: S1, S2, Regular, Murmur present.  Gastrointestinal: Bowel Sound present, soft, NT/ND  Extremities: No cyanosis or clubbing. No peripheral edema  Neurological: Alert and oriented x 3, no focal deficits  Psychiatric: Normal mood, normal affect  : No CVA tenderness. No trotter.   Skin: No rashes    Data:      140  |  100  |  16  ----------------------------<  129<H>  3.7   |  22  |  0.85    Ca    9.1      05 Aug 2018 06:57    TPro  7.3  /  Alb  3.8  /  TBili  0.5  /  DBili      /  AST  34  /  ALT  18  /  AlkPhos  65      Creatinine Trend: 0.85 <--, 0.93 <--                        13.3   8.85  )-----------( 157      ( 05 Aug 2018 08:36 )             39.9     Urine Studies:  Urinalysis Basic - ( 04 Aug 2018 15:26 )    Color: PL Yellow / Appearance: Clear / S.015 / pH:   Gluc:  / Ketone: Negative  / Bili: Negative / Urobili: Negative   Blood:  / Protein: 100 mg/dL / Nitrite: Negative   Leuk Esterase: Negative / RBC: 3-5 /HPF / WBC    Sq Epi:  / Non Sq Epi:  / Bacteria:

## 2018-08-06 PROCEDURE — 99232 SBSQ HOSP IP/OBS MODERATE 35: CPT

## 2018-08-06 PROCEDURE — 93923 UPR/LXTR ART STDY 3+ LVLS: CPT | Mod: 26

## 2018-08-06 RX ORDER — ACETAMINOPHEN 500 MG
2 TABLET ORAL
Qty: 0 | Refills: 0 | COMMUNITY
Start: 2018-08-06

## 2018-08-06 RX ORDER — AMLODIPINE BESYLATE 2.5 MG/1
1 TABLET ORAL
Qty: 0 | Refills: 0 | COMMUNITY
Start: 2018-08-06

## 2018-08-06 RX ORDER — IBUPROFEN 200 MG
1 TABLET ORAL
Qty: 0 | Refills: 0 | COMMUNITY

## 2018-08-06 RX ADMIN — ENOXAPARIN SODIUM 40 MILLIGRAM(S): 100 INJECTION SUBCUTANEOUS at 13:11

## 2018-08-06 RX ADMIN — Medication 650 MILLIGRAM(S): at 05:01

## 2018-08-06 RX ADMIN — LISINOPRIL 10 MILLIGRAM(S): 2.5 TABLET ORAL at 05:01

## 2018-08-06 RX ADMIN — Medication 650 MILLIGRAM(S): at 22:53

## 2018-08-06 RX ADMIN — Medication 650 MILLIGRAM(S): at 05:31

## 2018-08-06 RX ADMIN — AMLODIPINE BESYLATE 5 MILLIGRAM(S): 2.5 TABLET ORAL at 05:01

## 2018-08-06 RX ADMIN — Medication 650 MILLIGRAM(S): at 22:23

## 2018-08-06 NOTE — PHYSICAL THERAPY INITIAL EVALUATION ADULT - BALANCE DISTURBANCE, IDENTIFIED IMPAIRMENT CONTRIBUTE, REHAB EVAL
impaired sensory feedback/decreased strength/decreased sensation/decrease endurance/impaired postural control

## 2018-08-06 NOTE — DISCHARGE NOTE ADULT - HOSPITAL COURSE
89 y/o F with pmhx of hip fx and pshx of hysterectomy and hip replacement presents s/p mechanical fall last night w/ hematoma on head. Pt tripped over her wheelchair, was unable to call 911 as she wasn't able to get up. 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  BP readings much better .   Fall from standing, initial encounter. Mechanical fall per pt . CT scans negative for fracture . physical therapy eval; home care   Cellulitis of lower extremity.   monitor off  . ID consulted . Will check FRACISCO to R/O PVD .      Problem/Plan - 4:  ·  Problem: Anemia.  Plan: Work up in progress.      Problem/Plan - 5:  ·  Problem: Hypernatremia.  Plan: Renal consulted. Increasing PO water intake. Rpt BMP normal. 89 y/o F with pmhx of hip fx and pshx of hysterectomy and hip replacement presents s/p mechanical fall last night w/ hematoma on head. Pt tripped over her wheelchair, was unable to call 911 as she wasn't able to get up. 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  BP readings much better .   Fall from standing, Mechanical fall per pt . CT scans negative for fracture . physical therapy eval; for rehab;   Cellulitis of lower extremity. no sign of infection; monitor off  antibiotics;  ID consulted .    seen by vascular fro  arterial insufficiency  and venous insuff   conservative management  vascular team d/w pt trial of pletal 50 mg twice daily  if sx worsen and risks and benefits  at this time pt wants to hold off ; follow up by MD at rehab;  will f/u as outpatient with vascular team;  cleared for discharge to rehab.

## 2018-08-06 NOTE — DISCHARGE NOTE ADULT - CARE PROVIDER_API CALL
Naldo Sheikh), Cardiology; Internal Medicine  [address] [place] [address]  Phone: [phone]  Fax: [phone]    Deepak Kahn), Vascular Surgery  [address] [facility]  [address] [address]  Phone: [phone]  Fax: [phone]

## 2018-08-06 NOTE — PROGRESS NOTE ADULT - SUBJECTIVE AND OBJECTIVE BOX
INTERVAL HPI/OVERNIGHT EVENTS: I want to go home.   Vital Signs Last 24 Hrs  T(C): 36.7 (06 Aug 2018 20:08), Max: 36.9 (06 Aug 2018 04:59)  T(F): 98.1 (06 Aug 2018 20:08), Max: 98.5 (06 Aug 2018 04:59)  HR: 76 (06 Aug 2018 20:08) (63 - 94)  BP: 147/66 (06 Aug 2018 20:08) (115/55 - 158/54)  BP(mean): --  RR: 18 (06 Aug 2018 20:08) (18 - 18)  SpO2: 97% (06 Aug 2018 20:08) (95% - 100%)  I&O's Summary    05 Aug 2018 07:01  -  06 Aug 2018 07:00  --------------------------------------------------------  IN: 720 mL / OUT: 920 mL / NET: -200 mL    06 Aug 2018 07:01  -  06 Aug 2018 21:36  --------------------------------------------------------  IN: 700 mL / OUT: 350 mL / NET: 350 mL      MEDICATIONS  (STANDING):  amLODIPine   Tablet 5 milliGRAM(s) Oral daily  enoxaparin Injectable 40 milliGRAM(s) SubCutaneous daily    MEDICATIONS  (PRN):  acetaminophen   Tablet. 650 milliGRAM(s) Oral every 6 hours PRN Moderate Pain (4 - 6)    LABS:                        13.3   8.85  )-----------( 157      ( 05 Aug 2018 08:36 )             39.9     08-05    140  |  100  |  16  ----------------------------<  129<H>  3.7   |  22  |  0.85    Ca    9.1      05 Aug 2018 06:57          CAPILLARY BLOOD GLUCOSE              REVIEW OF SYSTEMS:  CONSTITUTIONAL: No fever, weight loss, or fatigue  EYES: No eye pain, visual disturbances, or discharge  ENMT:  No difficulty hearing, tinnitus, vertigo; No sinus or throat pain  NECK: No pain or stiffness  BREASTS: No pain, masses, or nipple discharge  RESPIRATORY: No cough, wheezing, chills or hemoptysis; No shortness of breath  CARDIOVASCULAR: No chest pain, palpitations, dizziness, or leg swelling  GASTROINTESTINAL: No abdominal or epigastric pain. No nausea, vomiting, or hematemesis; No diarrhea or constipation. No melena or hematochezia.  GENITOURINARY: No dysuria, frequency, hematuria, or incontinence  NEUROLOGICAL: No headaches, memory loss, loss of strength, numbness, or tremors      Consultant(s) Notes Reviewed:  [x ] YES  [ ] NO    PHYSICAL EXAM:  GENERAL: NAD, well-groomed, well-developed, not in any distress ,  HEAD:  Atraumatic, Normocephalic  EYES: EOMI, PERRLA, conjunctiva and sclera clear  ENMT: No tonsillar erythema, exudates, or enlargement; Moist mucous membranes, Good dentition, No lesions  NECK: Supple, No JVD, Normal thyroid  NERVOUS SYSTEM:  Alert & Oriented X3, No focal deficit   CHEST/LUNG: Good air entry bilateral with no  rales, rhonchi, wheezing, or rubs  HEART: Regular rate and rhythm; No murmurs, rubs, or gallops  ABDOMEN: Soft, Nontender, Nondistended; Bowel sounds present  EXTREMITIES:  erythema with ulcers     Care Discussed with Consultants/Other Providers [ x] YES  [ ] NO

## 2018-08-06 NOTE — PHYSICAL THERAPY INITIAL EVALUATION ADULT - PRECAUTIONS/LIMITATIONS, REHAB EVAL
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 Pt states she had a mechanical fall on Friday evening. Admits to HS, denies LOC. Pt was unable to get up at that time. Pt was on the ground overnight before she was able to get to a phone to call for help. In ED Pt states she has L hip pain. Denies any L hip pain prior to the fall. Of note, Pt had L hip hemiarthroplasty done in 2008 by Dr. Freitas. Pt states her L leg has been shorter since the surgery and has progressively been getting shorter over the years. Pt has not followed up with orthopedics since the surgery. No other orthopedic complaints. Pt states she normally ambulates unassisted however has a walker and wheelchair at home she uses occasionally.

## 2018-08-06 NOTE — PHYSICAL THERAPY INITIAL EVALUATION ADULT - ADDITIONAL COMMENTS
pt spouse on 4 dsu who has dementia and pt is primary caregiver for ; pt decline id caregiver  ; pt, spouse and developmentally delayed dtr (who is current at [facility] ) live in ranch style home ramp to enter with rail; pt has w/c and rolling walker occasion use ; pt otherwise independent ' pt s/p fall trip over w/c lay on ground x 24 hrs until phone call from hospital inform re spouse ready for d/c and pt ask for help - see SW note

## 2018-08-06 NOTE — PROGRESS NOTE ADULT - SUBJECTIVE AND OBJECTIVE BOX
Patient is a 89y old  Female who presents with a chief complaint of fall (06 Aug 2018 12:17)      Vascular Surgery Attending Progress Note    Interval HPI: pt w/o new c/o     Medications:  acetaminophen   Tablet. 650 milliGRAM(s) Oral every 6 hours PRN  amLODIPine   Tablet 5 milliGRAM(s) Oral daily  enoxaparin Injectable 40 milliGRAM(s) SubCutaneous daily      Vital Signs Last 24 Hrs  T(C): 36.6 (06 Aug 2018 16:09), Max: 36.9 (06 Aug 2018 04:59)  T(F): 97.9 (06 Aug 2018 16:09), Max: 98.5 (06 Aug 2018 04:59)  HR: 67 (06 Aug 2018 16:09) (63 - 94)  BP: 129/56 (06 Aug 2018 16:09) (115/55 - 158/54)  BP(mean): --  RR: 18 (06 Aug 2018 16:09) (18 - 18)  SpO2: 100% (06 Aug 2018 16:09) (95% - 100%)  I&O's Summary    05 Aug 2018 07:01  -  06 Aug 2018 07:00  --------------------------------------------------------  IN: 720 mL / OUT: 920 mL / NET: -200 mL    06 Aug 2018 07:01  -  06 Aug 2018 18:36  --------------------------------------------------------  IN: 600 mL / OUT: 0 mL / NET: 600 mL        Physical Exam:  Neuro  A&Ox3 VSS  Vascular:  stable   Pulses  femoral   rt    +12       lt   +12       LABS:                        13.3   8.85  )-----------( 157      ( 05 Aug 2018 08:36 )             39.9     08-05    140  |  100  |  16  ----------------------------<  129<H>  3.7   |  22  |  0.85    Ca    9.1      05 Aug 2018 06:57    FARCISCO/PVR reviewed       PHI JARA MD  [phone]

## 2018-08-06 NOTE — PHYSICAL THERAPY INITIAL EVALUATION ADULT - NS ASR WT BEARING DETAIL LLE
weight-bearing as tolerated/per Ortho for superomedial migration L hip (LLE progressively shorter since L BETSY in 2008 ) no acute Ortho intervention

## 2018-08-06 NOTE — DISCHARGE NOTE ADULT - CARE PLAN
Principal Discharge DX:	Hypertensive urgency  Goal:	stable BP  Assessment and plan of treatment:	continue medication as prescribed  follow up with your doctor BP monitoring  home care follow up  Secondary Diagnosis:	Fall from standing, initial encounter  Assessment and plan of treatment:	fall precaution  assistance at home for safety  home care Principal Discharge DX:	Hypertensive urgency  Goal:	stable BP  Assessment and plan of treatment:	continue medication as prescribed  follow up with your doctor BP monitoring and adjust medication by your doctro  home care follow up  Secondary Diagnosis:	Fall from standing, initial encounter  Assessment and plan of treatment:	fall precaution  physical therapy at rehab  assistance at home for safety  home care  Secondary Diagnosis:	Venous insufficiency of both lower extremities  Assessment and plan of treatment:	right lateral malleolus wound care with medihoney daily  skin care  monitor off antibiotics-monitor for fever, any signs and symptoms of infection by rehab doctor  follow up with vascular doctor as outpatient  Secondary Diagnosis:	Arterial insufficiency of lower extremity  Assessment and plan of treatment:	follow up with vascular doctor as outpatient

## 2018-08-06 NOTE — PHYSICAL THERAPY INITIAL EVALUATION ADULT - DISCHARGE DISPOSITION, PT EVAL
rehabilitation facility/Subacute rehab to increase functional mobility ,strength, balance, endurance, fall prevention education continued

## 2018-08-06 NOTE — DISCHARGE NOTE ADULT - SECONDARY DIAGNOSIS.
Fall from standing, initial encounter Venous insufficiency of both lower extremities Arterial insufficiency of lower extremity

## 2018-08-06 NOTE — PHYSICAL THERAPY INITIAL EVALUATION ADULT - TRANSFER SAFETY CONCERNS NOTED: SIT/STAND, REHAB EVAL
decreased balance during turns/decreased weight-shifting ability/decreased step length/decreased safety awareness/losing balance

## 2018-08-06 NOTE — CONSULT NOTE ADULT - ASSESSMENT
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 Lozano or own orthopedist as outpatient  Discussed with attending
89 y/o F with pmhx of hip fx and pshx of hysterectomy and hip replacement presents s/p mechanical fall last night w/ hematoma on head. Yesterday hypernatremic because she was on floor all night and unable to reach water.
89 y/o F with pmhx of hip fx and pshx of hysterectomy and hip replacement presents s/p mechanical fall last night w/ hematoma on head. Pt tripped over her wheelchair, was unable to call 911 as she wasn't able to get up. Notes associated left hip pain, which was fractured in 2008. Denies loc, blurred vision, neck pain, cp.  Not on blood thinners. Endorses use of Advil. Not on any other meds. No PMD. I want to eat as my birthday today. (04 Aug 2018 18:15)    Pt without fever.  Initial wbc 10.9 --> 8.8.  UA (-) LE/nit.  Urine cx negative.  Pt with Rt lateral maleolar ulcer.   Has been off antibiotics.  ID consult called to evaluate for need for antibiotics.      Seen by Vascular, being worked up for PAD, and pt s/p FRACISCO/PVR    Problem/Plan - 1:    ·	rt lateral malleolus ulcer    - No fever, leukocytosis or signs of infection.  Likely ulcer related to underlying arterial disease.      - Cont to monitor off abx.    - Local wound care, f/u vascular    No acute ID issues at this time    Lorie Valle  [phone]
89 y old female w arterial insuff w sx and venous insuff    Plan med conserv managment  recommend teddy/pvr s/o art insuff  will follow

## 2018-08-06 NOTE — PHYSICAL THERAPY INITIAL EVALUATION ADULT - IMPAIRED TRANSFERS: SIT/STAND, REHAB EVAL
decreased sensation/impaired sensory feedback/impaired postural control/impaired balance/decreased endurance , min unsteady throughout , vc to stand upright/cognition/decreased strength

## 2018-08-06 NOTE — PROGRESS NOTE ADULT - ASSESSMENT
89 y/o F with pmhx of hip fx and pshx of hysterectomy and hip replacement presents s/p mechanical fall last night w/ hematoma on head. Pt tripped over her wheelchair, was unable to call 911 as she wasn't able to get up. Notes associated left hip pain, which was fractured in 2008. Denies loc, blurred vision, neck pain, cp.  Not on blood thinners. Endorses use of Advil. Not on any other meds. No PMD.     Problem/Plan - 1:  ·  Problem: Hypertensive urgency.  Plan:  BP readings much better .      Problem/Plan - 2:  ·  Problem: Fall from standing, initial encounter.  Plan: Mechanical fall per pt . CT scans negative for fracture . Will consult PT.      Problem/Plan - 3:  ·  Problem: Cellulitis of lower extremity with leg ulcers .  Plan: Secondary to Arterial and Venous Insufficiency . Holding Abxs . ID and Vascular help appreciated.      Problem/Plan - 4:  ·  Problem: Anemia.  Plan: Work up noted.      Problem/Plan - 5:  ·  Problem: Hypernatremia.  Plan: Corrected.      DVT : prophylaxis.     Disposition : DC planning.

## 2018-08-06 NOTE — DISCHARGE NOTE ADULT - CARE PROVIDERS DIRECT ADDRESSES
[email or web address]

## 2018-08-06 NOTE — PHYSICAL THERAPY INITIAL EVALUATION ADULT - GENERAL OBSERVATIONS, REHAB EVAL
pt received in bedside chair nad + chair alarm active ;pt with min amount sanguinous drainage on sock R , sock removed and ulcer draining min amount + min amount yellow slough ,foot /ankle erythema HAIR Swan and rn Any in and examine

## 2018-08-06 NOTE — DISCHARGE NOTE ADULT - MEDICATION SUMMARY - MEDICATIONS TO TAKE
I will START or STAY ON the medications listed below when I get home from the hospital:    acetaminophen 325 mg oral tablet  -- 2 tab(s) by mouth every 6 hours, As needed, Moderate Pain (4 - 6)  -- Indication: For pain    amLODIPine 5 mg oral tablet  -- 1 tab(s) by mouth once a day  -- Indication: For Hypertension     Centrum Silver oral tablet  -- 1 tab(s) by mouth once a day  -- Indication: For Vitamin I will START or STAY ON the medications listed below when I get home from the hospital:    acetaminophen 325 mg oral tablet  -- 2 tab(s) by mouth every 6 hours, As needed, Moderate Pain (4 - 6)  -- Indication: For pain    enoxaparin  -- 40 milligram(s) subcutaneous once a day  -- Indication: For DVT prophylaxis    amLODIPine 5 mg oral tablet  -- 1 tab(s) by mouth once a day  -- Indication: For Hypertension     Centrum Silver oral tablet  -- 1 tab(s) by mouth once a day  -- Indication: For Vitamin

## 2018-08-06 NOTE — PROGRESS NOTE ADULT - ASSESSMENT
89 y old female w arterial insuff w sx and venous insuff    Plan med conserv managment  d/w pt trial of pletal 50 bid if sx worsen  will f/u as outpt

## 2018-08-06 NOTE — PHYSICAL THERAPY INITIAL EVALUATION ADULT - IMPAIRMENTS FOUND, PT EVAL
muscle strength/gait, locomotion, and balance/aerobic capacity/endurance/cognitive impairment/posture

## 2018-08-06 NOTE — PHYSICAL THERAPY INITIAL EVALUATION ADULT - PERTINENT HX OF CURRENT PROBLEM, REHAB EVAL
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 Left acetabulum is completely remodeled and ill-defined. There is pseudoarticulation and degeneration between the intertrochanteric left femur and the left acetabulum. No evidence of acute fracture; CT RLE AND XRAY R FOOT : healed fx deformity 5th MT diaphysis consist w/ prior injury , scattered OA TMT/MTP jt's,

## 2018-08-06 NOTE — PHYSICAL THERAPY INITIAL EVALUATION ADULT - IMPAIRMENTS CONTRIBUTING TO GAIT DEVIATIONS, PT EVAL
decreased strength/impaired postural control/impaired sensory feedback/decreased sensation/decreased endurance ,min unsteady throughout , vc for proper hand placement on  on walker/impaired balance/cognition

## 2018-08-06 NOTE — PHYSICAL THERAPY INITIAL EVALUATION ADULT - GAIT DEVIATIONS NOTED, PT EVAL
decreased stride length/decreased malika/increased time in double stance/decreased weight-shifting ability/decreased step length

## 2018-08-06 NOTE — DISCHARGE NOTE ADULT - PLAN OF CARE
stable BP continue medication as prescribed  follow up with your doctor BP monitoring  home care follow up fall precaution  assistance at home for safety  home care continue medication as prescribed  follow up with your doctor BP monitoring and adjust medication by your doctro  home care follow up fall precaution  physical therapy at rehab  assistance at home for safety  home care right lateral malleolus wound care with medihoney daily  skin care  monitor off antibiotics-monitor for fever, any signs and symptoms of infection by rehab doctor  follow up with vascular doctor as outpatient follow up with vascular doctor as outpatient

## 2018-08-06 NOTE — DISCHARGE NOTE ADULT - HOME CARE AGENCY
Your home care services has been referred to [facility] ([phone]).  Please call them to inquire about time of Registered Nurse visit.

## 2018-08-06 NOTE — PHYSICAL THERAPY INITIAL EVALUATION ADULT - IMPAIRMENTS CONTRIBUTING IMPAIRED BED MOBILITY, REHAB EVAL
decreased endurance; sat on bed x 5 min prior to return to bed with assist steady close supervision/decreased strength

## 2018-08-06 NOTE — CONSULT NOTE ADULT - SUBJECTIVE AND OBJECTIVE BOX
Patient is a 89y old  Female who presents with a chief complaint of fall (06 Aug 2018 12:17)      HPI:  89 y/o F with pmhx of hip fx and pshx of hysterectomy and hip replacement presents s/p mechanical fall last night w/ hematoma on head. Pt tripped over her wheelchair, was unable to call 911 as she wasn't able to get up. Notes associated left hip pain, which was fractured in . Denies loc, blurred vision, neck pain, cp.  Not on blood thinners. Endorses use of Advil. Not on any other meds. No PMD. I want to eat as my birthday today. (04 Aug 2018 18:15)      REVIEW OF SYSTEMS:    CONSTITUTIONAL: No fever, weight loss, or fatigue  EYES: No eye pain, visual disturbances, or discharge  ENMT:  No sore throat  NECK: No pain or stiffness  RESPIRATORY: No cough, wheezing, chills or hemoptysis; No shortness of breath  CARDIOVASCULAR: No chest pain, palpitations, dizziness, or leg swelling  GASTROINTESTINAL: No abdominal or epigastric pain. No nausea, vomiting, or hematemesis; No diarrhea or constipation. No melena or hematochezia.  GENITOURINARY: No dysuria, frequency, hematuria, or incontinence  NEUROLOGICAL: No headaches, memory loss, loss of strength, numbness, or tremors  SKIN: No itching, burning, rashes, or lesions   LYMPH NODES: No enlarged glands  MUSCULOSKELETAL: No joint pain or swelling; No muscle, back, or extremity pain      PAST MEDICAL & SURGICAL HISTORY:  Hip fracture:   S/P hysterectomy:   S/P hip replacement      Allergies    No Known Allergies    Intolerances        FAMILY HISTORY:  No pertinent family history in first degree relatives      SOCIAL HISTORY:        MEDICATIONS  (STANDING):  amLODIPine   Tablet 5 milliGRAM(s) Oral daily  enoxaparin Injectable 40 milliGRAM(s) SubCutaneous daily    MEDICATIONS  (PRN):  acetaminophen   Tablet. 650 milliGRAM(s) Oral every 6 hours PRN Moderate Pain (4 - 6)      Vital Signs Last 24 Hrs  T(C): 36.3 (06 Aug 2018 13:12), Max: 36.9 (06 Aug 2018 04:59)  T(F): 97.3 (06 Aug 2018 13:12), Max: 98.5 (06 Aug 2018 04:59)  HR: 94 (06 Aug 2018 13:12) (63 - 94)  BP: 158/54 (06 Aug 2018 13:12) (115/55 - 158/54)  BP(mean): --  RR: 18 (06 Aug 2018 13:12) (18 - 18)  SpO2: 95% (06 Aug 2018 13:12) (95% - 97%)    PHYSICAL EXAM:    GENERAL: NAD, well-groomed  HEAD:  Atraumatic, Normocephalic  EYES: EOMI, PERRLA, conjunctiva and sclera clear  ENMT: No tonsillar erythema, exudates, or enlargement; Moist mucous membranes  NECK: Supple, No JVD  CHEST/LUNG: Clear to percussion bilaterally; No rales, rhonchi, wheezing, or rubs  HEART: Regular rate and rhythm; No murmurs, rubs, or gallops  ABDOMEN: Soft, Nontender, Nondistended; Bowel sounds present  EXTREMITIES:  2+ Peripheral Pulses, No clubbing, cyanosis, or edema  LYMPH: No lymphadenopathy noted  SKIN: No rashes or lesions    LABS:  CBC Full  -  ( 05 Aug 2018 08:36 )  WBC Count : 8.85 K/uL  Hemoglobin : 13.3 g/dL  Hematocrit : 39.9 %  Platelet Count - Automated : 157 K/uL  Mean Cell Volume : 85.4 fl  Mean Cell Hemoglobin : 28.5 pg  Mean Cell Hemoglobin Concentration : 33.3 gm/dL  Auto Neutrophil # : x  Auto Lymphocyte # : x  Auto Monocyte # : x  Auto Eosinophil # : x  Auto Basophil # : x  Auto Neutrophil % : x  Auto Lymphocyte % : x  Auto Monocyte % : x  Auto Eosinophil % : x  Auto Basophil % : x      08-05    140  |  100  |  16  ----------------------------<  129<H>  3.7   |  22  |  0.85    Ca    9.1      05 Aug 2018 06:57                              MICROBIOLOGY:        Urinalysis Basic - ( 04 Aug 2018 15:26 )    Color: PL Yellow / Appearance: Clear / S.015 / pH: x  Gluc: x / Ketone: Negative  / Bili: Negative / Urobili: Negative   Blood: x / Protein: 100 mg/dL / Nitrite: Negative   Leuk Esterase: Negative / RBC: 3-5 /HPF / WBC x   Sq Epi: x / Non Sq Epi: x / Bacteria: x                RADIOLOGY: Patient is a 89y old  Female who presents with a chief complaint of fall (06 Aug 2018 12:17)      HPI:  89 y/o F with pmhx of hip fx and pshx of hysterectomy and hip replacement presents s/p mechanical fall last night w/ hematoma on head. Pt tripped over her wheelchair, was unable to call 911 as she wasn't able to get up. Notes associated left hip pain, which was fractured in . Denies loc, blurred vision, neck pain, cp.  Not on blood thinners. Endorses use of Advil. Not on any other meds. No PMD. I want to eat as my birthday today. (04 Aug 2018 18:15)    Pt without fever.  Initial wbc 10.9 --> 8.8.  UA (-) LE/nit.  Urine cx negative.  Pt with Rt lateral maleolar ulcer.   Has been off antibiotics.  ID consult called to evaluate for need for antibiotics.      Seen by Vascular, being worked up for PAD, and pt s/p FRACISCO/PVR      REVIEW OF SYSTEMS:    CONSTITUTIONAL: No fever, weight loss, or fatigue  EYES: No eye pain, visual disturbances, or discharge  ENMT:  No sore throat  NECK: No pain or stiffness  RESPIRATORY: No cough, wheezing, chills or hemoptysis; No shortness of breath  CARDIOVASCULAR: No chest pain, palpitations, dizziness, or leg swelling  GASTROINTESTINAL: No abdominal or epigastric pain. No nausea, vomiting, or hematemesis; No diarrhea or constipation. No melena or hematochezia.  GENITOURINARY: No dysuria, frequency, hematuria, or incontinence  NEUROLOGICAL: No headaches, memory loss, loss of strength, numbness, or tremors  SKIN: No itching, burning, rashes, or lesions   LYMPH NODES: No enlarged glands  MUSCULOSKELETAL: No joint pain or swelling; No muscle, back, or extremity pain      PAST MEDICAL & SURGICAL HISTORY:  Hip fracture:   S/P hysterectomy:   S/P hip replacement      Allergies    No Known Allergies    Intolerances        FAMILY HISTORY:  No pertinent family history in first degree relatives      SOCIAL HISTORY:    Lives with /daughter.  No reported smoking, ivdu, etoh    MEDICATIONS  (STANDING):  amLODIPine   Tablet 5 milliGRAM(s) Oral daily  enoxaparin Injectable 40 milliGRAM(s) SubCutaneous daily    MEDICATIONS  (PRN):  acetaminophen   Tablet. 650 milliGRAM(s) Oral every 6 hours PRN Moderate Pain (4 - 6)      Vital Signs Last 24 Hrs  T(C): 36.3 (06 Aug 2018 13:12), Max: 36.9 (06 Aug 2018 04:59)  T(F): 97.3 (06 Aug 2018 13:12), Max: 98.5 (06 Aug 2018 04:59)  HR: 94 (06 Aug 2018 13:12) (63 - 94)  BP: 158/54 (06 Aug 2018 13:12) (115/55 - 158/54)  BP(mean): --  RR: 18 (06 Aug 2018 13:12) (18 - 18)  SpO2: 95% (06 Aug 2018 13:12) (95% - 97%)    PHYSICAL EXAM:    GENERAL: NAD  HEAD:  Atraumatic, Normocephalic  EYES: EOMI, PERRLA, conjunctiva and sclera clear  ENMT: No tonsillar erythema, exudates, or enlargement; Moist mucous membranes  NECK: Supple, No JVD  CHEST/LUNG: Clear to percussion bilaterally; No rales, rhonchi, wheezing, or rubs  HEART: Regular rate and rhythm; No murmurs, rubs, or gallops  ABDOMEN: Soft, Nontender, Nondistended; Bowel sounds present  EXTREMITIES:  2+ Peripheral Pulses, No clubbing, cyanosis, or edema  LYMPH: No lymphadenopathy noted  SKIN: b/l LE with stasis dermatitis, red scaly skin R>L.  Rt lateral maleolus open ulcer, no purulence, scant serosanguinous dranainge    LABS:  CBC Full  -  ( 05 Aug 2018 08:36 )  WBC Count : 8.85 K/uL  Hemoglobin : 13.3 g/dL  Hematocrit : 39.9 %  Platelet Count - Automated : 157 K/uL  Mean Cell Volume : 85.4 fl  Mean Cell Hemoglobin : 28.5 pg  Mean Cell Hemoglobin Concentration : 33.3 gm/dL  Auto Neutrophil # : x  Auto Lymphocyte # : x  Auto Monocyte # : x  Auto Eosinophil # : x  Auto Basophil # : x  Auto Neutrophil % : x  Auto Lymphocyte % : x  Auto Monocyte % : x  Auto Eosinophil % : x  Auto Basophil % : x      08-    140  |  100  |  16  ----------------------------<  129<H>  3.7   |  22  |  0.85    Ca    9.1      05 Aug 2018 06:57            MICROBIOLOGY:        Urinalysis Basic - ( 04 Aug 2018 15:26 )    Color: PL Yellow / Appearance: Clear / S.015 / pH: x  Gluc: x / Ketone: Negative  / Bili: Negative / Urobili: Negative   Blood: x / Protein: 100 mg/dL / Nitrite: Negative   Leuk Esterase: Negative / RBC: 3-5 /HPF / WBC x   Sq Epi: x / Non Sq Epi: x / Bacteria: x      Culture - Urine (18 @ 20:38)    Specimen Source: .Urine Catheterized    Culture Results:   No growth              RADIOLOGY:      < from: [facility] Physiol Extremity Lower 3+ Level, BI (18 @ 12:32) >  INTERPRETATION:  History: Ulcer right ankle.    Risk factors: Smoking, hypertension, old age.    The right ankle brachial index equals 0.68.    The blood pressure measurements on the right are 142 mmHg in the upper   thigh, 132 mmHg in the lower thigh, in excess of 250 mmHg in the upper   calf and at the right ankle 84 mmHg posterior tibial artery and 102 mmHg   in the dorsal pedis artery.    The blood pressure measurement right great toe is 49 mm and the right   toe/brachial index equals 0.32.    The blood pressure measurements at the left ankle are 233 mmHg in the   posterior tibial artery and in excess of 250 mmHg in the dorsal pedis   artery. Because of the noncompressible nature of the arteries, a   meaningful ankle/brachial index cannot be calculated.    The blood pressure measurement at the left great toe is 49 mm and the   left toe/brachial index equals 0.32.    The amplitude ofthe pulse volume recordings on the right are normal in   the upper and lower thigh, reduced in the upper calf and ankle and   further reduced at the levels of the metatarsals and toes.    The amplitude of the pulse volume recordings on the left are similarly   reduced at the levels of the calf and ankle and further reduced at the   levels of the metatarsals and toes.    Impression: There is right lower extremity arterial vascular disease,   likely affecting the trifurcation arteries and the smallarteries of the   foot.  There is probably a similar pattern of disease on the left.    < end of copied text >      < from: CT Lower Extremity No Cont, Right (18 @ 17:26) >    FINDINGS:    BONE: Diffuse osteopenia. No acute fracture or dislocation. Healed   fracture deformity of the fifth metatarsal diaphysis. No focal lytic or   blastic lesions are identified. Cartilage space narrowing and marginal   osteophyte formation at the first through fourth TMT joints and first MTP   joint.    SOFT TISSUE: Mineralization in the tendons about the ankle, consistent   with prior injury. Diffuse muscular atrophy. Vascular calcifications.   Skin thickening and subcutaneous edema about the ankle joint and along   the dorsum of the foot. No focal, drainable fluid collections are   identified.      IMPRESSION:  1.  No acute fracture or dislocation.  2.  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    < end of copied text >        < from: CT Head No Cont (18 @ 17:26) >  FINDINGS:  No acute hemorrhage or infarct is identified. No   hydrocephalus, midline shift or extra-axial collections are present.   Age-appropriate involutional changes and mild microvascular ischemic   change have mildly progressed compared with the prior examination. 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    No displaced calvarial fracture is identified. The orbits are not   remarkable in appearance. The visualized paranasal sinuses and   tympanomastoid cavities are free of acute disease.    Impression:    No acute hemorrhage or infarct. Age-appropriate involutional changes and   mild microvascular ischemic change.    < end of copied text >        < from: Xray Hip w/ Pelvis 2 or 3 Views, Left (18 @ 14:52) >  Findings:    Patient is status post left hip hemiarthroplasty. There is superomedial   subsidence of the left hip arthroplasty into the left iliac bone with   associated protrusio acetabuli and pseudoarticulation between the   intertrochanteric femur and the acetabulum. Degenerative changes at the   pseudoarticulation are noted. The left acetabulum is completely remodeled   and ill-defined. The left femur is elevated relative to the right due to   the chronic subsidence. There is no evidence of acute fracture or   dislocation. There is severe right hip arthrosis. Pubic symphysis is   intact. There is bilateral sacroiliac joint arthrosis. Lower lumbar   spondylosis is noted. Atheroscleroticdisease is noted.    Impression:    Status post left hip arthroplasty with chronic superomedial subsidence of   the arthroplasty into the left iliac bone with associated protrusio   acetabuli. Left acetabulum is completely remodeled and ill-defined. There   is pseudoarticulation and degeneration between the intertrochanteric left   femur and the left acetabulum. No evidence of acute fracture.    < end of copied text >        < from: Xray Chest 1 View AP/PA (18 @ 14:50) >  Impression:    There is mild airspace opacity within the lung apices bilaterally,   nonspecific. The heart size is within normal limits. Thoracic spondylosis   is noted.    < end of copied text >      < from: CT Lower Extremity No Cont, Right (18 @ 17:26) >    FINDINGS:    BONE: Diffuse osteopenia. No acute fracture or dislocation. Healed   fracture deformity of the fifth metatarsal diaphysis. No focal lytic or   blastic lesions are identified. Cartilage space narrowing and marginal   osteophyte formation at the first through fourth TMT joints and first MTP   joint.    SOFT TISSUE: Mineralization in the tendons about the ankle, consistent   with prior injury. Diffuse muscular atrophy. Vascular calcifications.   Skin thickening and subcutaneous edema about the ankle joint and along   the dorsum of the foot. No focal, drainable fluid collections are   identified.      IMPRESSION:  1.  No acute fracture or dislocation.  2.  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    < end of copied text >

## 2018-08-06 NOTE — DISCHARGE NOTE ADULT - MEDICATION SUMMARY - MEDICATIONS TO STOP TAKING
I will STOP taking the medications listed below when I get home from the hospital:    cephalexin 500 mg oral capsule  -- 1 cap(s) by mouth every 8 hours    minocycline 100 mg oral capsule  -- 1 cap(s) by mouth 2 times a day

## 2018-08-07 VITALS
DIASTOLIC BLOOD PRESSURE: 64 MMHG | TEMPERATURE: 98 F | HEART RATE: 80 BPM | RESPIRATION RATE: 18 BRPM | SYSTOLIC BLOOD PRESSURE: 131 MMHG | OXYGEN SATURATION: 96 %

## 2018-08-07 PROCEDURE — 73620 X-RAY EXAM OF FOOT: CPT

## 2018-08-07 PROCEDURE — 71045 X-RAY EXAM CHEST 1 VIEW: CPT

## 2018-08-07 PROCEDURE — 82550 ASSAY OF CK (CPK): CPT

## 2018-08-07 PROCEDURE — 51701 INSERT BLADDER CATHETER: CPT

## 2018-08-07 PROCEDURE — 73610 X-RAY EXAM OF ANKLE: CPT

## 2018-08-07 PROCEDURE — 80048 BASIC METABOLIC PNL TOTAL CA: CPT

## 2018-08-07 PROCEDURE — 80053 COMPREHEN METABOLIC PANEL: CPT

## 2018-08-07 PROCEDURE — 73502 X-RAY EXAM HIP UNI 2-3 VIEWS: CPT

## 2018-08-07 PROCEDURE — 97162 PT EVAL MOD COMPLEX 30 MIN: CPT

## 2018-08-07 PROCEDURE — 82746 ASSAY OF FOLIC ACID SERUM: CPT

## 2018-08-07 PROCEDURE — 93923 UPR/LXTR ART STDY 3+ LVLS: CPT

## 2018-08-07 PROCEDURE — 81001 URINALYSIS AUTO W/SCOPE: CPT

## 2018-08-07 PROCEDURE — 85027 COMPLETE CBC AUTOMATED: CPT

## 2018-08-07 PROCEDURE — 87086 URINE CULTURE/COLONY COUNT: CPT

## 2018-08-07 PROCEDURE — 83010 ASSAY OF HAPTOGLOBIN QUANT: CPT

## 2018-08-07 PROCEDURE — 76377 3D RENDER W/INTRP POSTPROCES: CPT

## 2018-08-07 PROCEDURE — 99285 EMERGENCY DEPT VISIT HI MDM: CPT | Mod: 25

## 2018-08-07 PROCEDURE — 82728 ASSAY OF FERRITIN: CPT

## 2018-08-07 PROCEDURE — 93005 ELECTROCARDIOGRAM TRACING: CPT | Mod: XU

## 2018-08-07 PROCEDURE — 82607 VITAMIN B-12: CPT

## 2018-08-07 PROCEDURE — 99232 SBSQ HOSP IP/OBS MODERATE 35: CPT

## 2018-08-07 PROCEDURE — 73700 CT LOWER EXTREMITY W/O DYE: CPT

## 2018-08-07 PROCEDURE — 83550 IRON BINDING TEST: CPT

## 2018-08-07 PROCEDURE — 70450 CT HEAD/BRAIN W/O DYE: CPT

## 2018-08-07 RX ORDER — ENOXAPARIN SODIUM 100 MG/ML
40 INJECTION SUBCUTANEOUS
Qty: 0 | Refills: 0 | COMMUNITY
Start: 2018-08-07

## 2018-08-07 RX ADMIN — AMLODIPINE BESYLATE 5 MILLIGRAM(S): 2.5 TABLET ORAL at 05:22

## 2018-08-07 RX ADMIN — ENOXAPARIN SODIUM 40 MILLIGRAM(S): 100 INJECTION SUBCUTANEOUS at 11:55

## 2018-08-07 NOTE — PROGRESS NOTE ADULT - SUBJECTIVE AND OBJECTIVE BOX
Patient is a 89y old  Female who presents with a chief complaint of fall (06 Aug 2018 12:17)      Vascular Surgery Attending Progress Note    Interval HPI: pt w/o c/o     Medications:  acetaminophen   Tablet. 650 milliGRAM(s) Oral every 6 hours PRN  amLODIPine   Tablet 5 milliGRAM(s) Oral daily  enoxaparin Injectable 40 milliGRAM(s) SubCutaneous daily      Vital Signs Last 24 Hrs  T(C): 36.6 (07 Aug 2018 05:16), Max: 37 (06 Aug 2018 23:37)  T(F): 97.8 (07 Aug 2018 05:16), Max: 98.6 (06 Aug 2018 23:37)  HR: 63 (07 Aug 2018 05:16) (63 - 94)  BP: 122/67 (07 Aug 2018 05:16) (122/67 - 158/54)  BP(mean): --  RR: 18 (07 Aug 2018 05:16) (18 - 18)  SpO2: 96% (07 Aug 2018 05:16) (95% - 100%)  I&O's Summary    06 Aug 2018 07:01  -  07 Aug 2018 07:00  --------------------------------------------------------  IN: 1180 mL / OUT: 800 mL / NET: 380 mL        Physical Exam:  Neuro  A&Ox3 VSS  Vascular:  stable                    PHI JARA MD  [phone]

## 2018-08-07 NOTE — PROGRESS NOTE ADULT - ASSESSMENT
89 y/o F with pmhx of hip fx and pshx of hysterectomy and hip replacement presents s/p mechanical fall last night w/ hematoma on head. Pt tripped over her wheelchair, was unable to call 911 as she wasn't able to get up. Notes associated left hip pain, which was fractured in 2008. Denies loc, blurred vision, neck pain, cp.  Not on blood thinners. Endorses use of Advil. Not on any other meds. No PMD.     Problem/Plan - 1:  ·  Problem: Hypertensive urgency.  Plan:  BP readings much better .      Problem/Plan - 2:  ·  Problem: Fall from standing, initial encounter.  Plan: Mechanical fall per pt . CT scans negative for fracture . Will consult PT.      Problem/Plan - 3:  ·  Problem: Cellulitis of lower extremity with leg ulcers with Venous and Arterial insufficiency  .  Plan: Secondary to Arterial and Venous Insufficiency . Holding Abxs . ID and Vascular help appreciated. No intervention.      Problem/Plan - 4:  ·  Problem: Anemia.  Plan: Work up noted.      Problem/Plan - 5:  ·  Problem: Hypernatremia.  Plan: Corrected.      DVT : prophylaxis.     Disposition : Dc planning to [facility].     Disposition : DC planning.

## 2018-08-07 NOTE — PROGRESS NOTE ADULT - ASSESSMENT
89 y old female w arterial insuff w sx and venous insuff    Plan med conserv managment  d/w pt trial of pletal 50 bid if sx worsen and risks and benefits  at this time pt wants to hold off   will f/u as outpt

## 2018-08-07 NOTE — PROGRESS NOTE ADULT - SUBJECTIVE AND OBJECTIVE BOX
INTERVAL HPI/OVERNIGHT EVENTS: I feel fine . going to Rehab , My  and daughter already there.   Vital Signs Last 24 Hrs  T(C): 36.9 (07 Aug 2018 10:30), Max: 37 (06 Aug 2018 23:37)  T(F): 98.5 (07 Aug 2018 10:30), Max: 98.6 (06 Aug 2018 23:37)  HR: 80 (07 Aug 2018 10:30) (63 - 94)  BP: 131/64 (07 Aug 2018 10:30) (122/67 - 158/54)  BP(mean): --  RR: 18 (07 Aug 2018 10:30) (18 - 18)  SpO2: 96% (07 Aug 2018 10:30) (95% - 100%)  I&O's Summary    06 Aug 2018 07:01  -  07 Aug 2018 07:00  --------------------------------------------------------  IN: 1180 mL / OUT: 800 mL / NET: 380 mL      MEDICATIONS  (STANDING):  amLODIPine   Tablet 5 milliGRAM(s) Oral daily  enoxaparin Injectable 40 milliGRAM(s) SubCutaneous daily    MEDICATIONS  (PRN):  acetaminophen   Tablet. 650 milliGRAM(s) Oral every 6 hours PRN Moderate Pain (4 - 6)    LABS:              CAPILLARY BLOOD GLUCOSE              REVIEW OF SYSTEMS:  CONSTITUTIONAL: No fever, weight loss, or fatigue  EYES: No eye pain, visual disturbances, or discharge  ENMT:  No difficulty hearing, tinnitus, vertigo; No sinus or throat pain  NECK: No pain or stiffness  RESPIRATORY: No cough, wheezing, chills or hemoptysis; No shortness of breath  CARDIOVASCULAR: No chest pain, palpitations, dizziness, or leg swelling  GASTROINTESTINAL: No abdominal or epigastric pain. No nausea, vomiting, or hematemesis; No diarrhea or constipation. No melena or hematochezia.  GENITOURINARY: No dysuria, frequency, hematuria, or incontinence  NEUROLOGICAL: No headaches, memory loss, loss of strength, numbness, or tremors  Consultant(s) Notes Reviewed:  [x ] YES  [ ] NO    PHYSICAL EXAM:  GENERAL: NAD, well-groomed, well-developed ,not in any distress ,  HEAD:  Atraumatic, Normocephalic  EYES: EOMI, PERRLA, conjunctiva and sclera clear  ENMT: No tonsillar erythema, exudates, or enlargement; Moist mucous membranes, Good dentition, No lesions  NECK: Supple, No JVD, Normal thyroid  NERVOUS SYSTEM:  Alert & Oriented X3, No focal deficit   CHEST/LUNG: Good air entry bilateral with no  rales, rhonchi, wheezing, or rubs  HEART: Regular rate and rhythm; No murmurs, rubs, or gallops  ABDOMEN: Soft, Nontender, Nondistended; Bowel sounds present  EXTREMITIES:  2+ Peripheral Pulses, No clubbing, cyanosis, or edema  SKIN: No rashes or lesions    Care Discussed with Consultants/Other Providers [ x] YES  [ ] NO

## 2020-10-05 NOTE — ED ADULT NURSE NOTE - NS ED NURSE REPORT GIVEN DT
Subjective:      Patient ID: Bradly aPrk is a 20 y.o. female.    Chief Complaint: Annual Exam and Asthma    HPI    Patient  asthma here today for annual exam and refill of asthma.   Unclear which daily inhaler she was using. Hasn't used in  A while. Never has been hospitalized for asthma. Diagnosed as a child. Using as needed inhaler nightly due to night time coughing.   Also would like referral to psychiatry for anxiety which has been happening for the last two years. Has worsened since her son was born.     LMP - currently   Last pap smear 2 years ago   No BC   Same male partner     Past Medical History:   Diagnosis Date    Anemia     Asthma        Past Surgical History:   Procedure Laterality Date     SECTION N/A 2018    Procedure:  SECTION;  Surgeon: Luci Rojas MD;  Location: [facility] L&D;  Service: OB/GYN;  Laterality: N/A;    TONSILLECTOMY, ADENOIDECTOMY         Family History   Problem Relation Age of Onset    Cancer Maternal Grandfather     Miscarriages / Stillbirths Mother     Cancer Maternal Grandmother         breast cancer       Social History     Socioeconomic History    Marital status: Single     Spouse name: Not on file    Number of children: 1    Years of education: Not on file    Highest education level: Not on file   Occupational History    Occupation: stay at home mother    Social Needs    Financial resource strain: Not on file    Food insecurity     Worry: Not on file     Inability: Not on file    Transportation needs     Medical: Not on file     Non-medical: Not on file   Tobacco Use    Smoking status: Current Every Day Smoker     Packs/day: 1.00     Years: 5.00     Pack years: 5.00    Smokeless tobacco: Never Used   Substance and Sexual Activity    Alcohol use: Yes     Comment: occasional     Drug use: No    Sexual activity: Yes     Partners: Male     Birth control/protection: None   Lifestyle    Physical activity     Days per  week: Not on file     Minutes per session: Not on file    Stress: Not on file   Relationships    Social connections     Talks on phone: Not on file     Gets together: Not on file     Attends [employer] service: Not on file     Active member of club or organization: Not on file     Attends meetings of clubs or organizations: Not on file     Relationship status: Not on file   Other Topics Concern    Not on file   Social History Narrative    Not on file       Health Maintenance Topics with due status: Not Due       Topic Last Completion Date    TETANUS VACCINE 06/14/2018       Medication List with Changes/Refills   New Medications    BECLOMETHASONE (QVAR) 40 MCG/ACTUATION AERO    Inhale 1 puff into the lungs 2 (two) times a day. Controller    MONTELUKAST (SINGULAIR) 10 MG TABLET    Take 1 tablet (10 mg total) by mouth every evening.   Current Medications    PRENATAL VIT-IRON FUM-FOLIC AC (PRENATAL VITAMIN) 27 MG IRON- 0.8 MG TAB    Take 1 tablet by mouth once daily.   Changed and/or Refilled Medications    Modified Medication Previous Medication    ALBUTEROL (PROVENTIL/VENTOLIN HFA) 90 MCG/ACTUATION INHALER albuterol 90 mcg/actuation inhaler       Inhale 2 puffs into the lungs every 6 (six) hours as needed for Wheezing.    Inhale 2 puffs into the lungs every 6 (six) hours as needed for Wheezing.       Review of patient's allergies indicates:   Allergen Reactions    Codeine Other (See Comments)     Did not know and historian did not know.    Pcn [penicillins] Other (See Comments)       Review of Systems   Constitutional: Negative for fever.   HENT: Negative for congestion.    Eyes: Negative for blurred vision.   Respiratory: Positive for cough. Negative for sputum production, shortness of breath and wheezing.    Cardiovascular: Negative for chest pain and leg swelling.   Gastrointestinal: Negative for abdominal pain, constipation and diarrhea.   Genitourinary: Negative for dysuria.   Skin: Negative for rash.    Neurological: Negative for headaches.       Objective:     Vitals:    10/05/20 1110   BP: 122/79   Pulse: 98   Temp: 98.8 °F (37.1 °C)     Body mass index is 20.15 kg/m².    Physical Exam  Vitals signs and nursing note reviewed.   Constitutional:       General: She is not in acute distress.     Appearance: She is well-developed.   HENT:      Head: Normocephalic and atraumatic.      Right Ear: External ear normal.      Left Ear: External ear normal.      Nose: Nose normal.   Eyes:      Conjunctiva/sclera: Conjunctivae normal.      Pupils: Pupils are equal, round, and reactive to light.   Neck:      Thyroid: No thyromegaly.   Cardiovascular:      Rate and Rhythm: Normal rate and regular rhythm.      Heart sounds: Normal heart sounds. No murmur.   Pulmonary:      Effort: Pulmonary effort is normal. No respiratory distress.      Breath sounds: Normal breath sounds. No wheezing or rales.   Chest:      Chest wall: No tenderness.   Abdominal:      General: Bowel sounds are normal. There is no distension.      Palpations: Abdomen is soft.      Tenderness: There is no abdominal tenderness.   Lymphadenopathy:      Cervical: No cervical adenopathy.   Skin:     General: Skin is warm and dry.   Neurological:      Mental Status: She is alert and oriented to person, place, and time.   Psychiatric:         Mood and Affect: Mood normal.         Behavior: Behavior normal.         Thought Content: Thought content normal.         Judgment: Judgment normal.         Assessment and Plan:     Encounter for wellness examination  -     CBC Without Differential; Future; Expected date: 10/05/2020  -     Comprehensive Metabolic Panel; Future; Expected date: 10/05/2020  -     Hemoglobin A1C; Future; Expected date: 10/05/2020  -     Lipid Panel; Future; Expected date: 10/05/2020  Age appropriate counseling    Moderate persistent asthma without complication  Will start Singulair and qvar  As needed albuterol   Stop smoking    Encounter to  establish care  -     Ambulatory referral/consult to Obstetrics / Gynecology; Future; Expected date: 10/12/2020    Asthma affecting pregnancy, antepartum  -     albuterol (PROVENTIL/VENTOLIN HFA) 90 mcg/actuation inhaler; Inhale 2 puffs into the lungs every 6 (six) hours as needed for Wheezing.  Dispense: 18 g; Refill: 11    Anxiety - will follow up in 2 weeks for this   Nicotine dependence - smoking cessation referral, 1800 quit now, pros/cons advised to patient     Other orders  -     beclomethasone (QVAR) 40 mcg/actuation Aero; Inhale 1 puff into the lungs 2 (two) times a day. Controller  Dispense: 8.7 each; Refill: 5  -     montelukast (SINGULAIR) 10 mg tablet; Take 1 tablet (10 mg total) by mouth every evening.  Dispense: 90 tablet; Refill: 3        Follow up in about 2 weeks (around 10/19/2020) for anxiety wellness .         04-Aug-2018 18:28

## 2023-10-23 NOTE — CONSULT NOTE ADULT - NSHPATTENDINGPLANDISCUSS_GEN_ALL_CORE
Second troponin not needed per Er SUNNY Magallanes.      Kemal Hirsch, DELMY  10/23/23 1309
PCP and patient
surg ho

## 2024-06-04 NOTE — PROGRESS NOTE ADULT - PROVIDER SPECIALTY LIST ADULT
Internal Medicine Over the last 2 wks traveled to [facility] and [place], reports worsening sob and having cp since first trip, returned home yesterday